# Patient Record
Sex: MALE | Race: WHITE | NOT HISPANIC OR LATINO | URBAN - METROPOLITAN AREA
[De-identification: names, ages, dates, MRNs, and addresses within clinical notes are randomized per-mention and may not be internally consistent; named-entity substitution may affect disease eponyms.]

---

## 2017-03-04 ENCOUNTER — EMERGENCY (EMERGENCY)
Facility: HOSPITAL | Age: 57
LOS: 1 days | Discharge: HOME | End: 2017-03-04
Attending: EMERGENCY MEDICINE | Admitting: EMERGENCY MEDICINE
Payer: COMMERCIAL

## 2017-03-04 VITALS
TEMPERATURE: 98 F | RESPIRATION RATE: 22 BRPM | HEART RATE: 104 BPM | SYSTOLIC BLOOD PRESSURE: 144 MMHG | OXYGEN SATURATION: 100 % | DIASTOLIC BLOOD PRESSURE: 95 MMHG

## 2017-03-04 DIAGNOSIS — T78.09XA ANAPHYLACTIC REACTION DUE TO OTHER FOOD PRODUCTS, INITIAL ENCOUNTER: ICD-10-CM

## 2017-03-04 DIAGNOSIS — R21 RASH AND OTHER NONSPECIFIC SKIN ERUPTION: ICD-10-CM

## 2017-03-04 DIAGNOSIS — Z91.010 ALLERGY TO PEANUTS: ICD-10-CM

## 2017-03-04 PROCEDURE — 99284 EMERGENCY DEPT VISIT MOD MDM: CPT

## 2017-03-04 RX ORDER — SODIUM CHLORIDE 9 MG/ML
1000 INJECTION INTRAMUSCULAR; INTRAVENOUS; SUBCUTANEOUS ONCE
Qty: 0 | Refills: 0 | Status: COMPLETED | OUTPATIENT
Start: 2017-03-04 | End: 2017-03-04

## 2017-03-04 RX ORDER — FAMOTIDINE 10 MG/ML
20 INJECTION INTRAVENOUS ONCE
Qty: 0 | Refills: 0 | Status: COMPLETED | OUTPATIENT
Start: 2017-03-04 | End: 2017-03-04

## 2017-03-04 RX ORDER — ASPIRIN/CALCIUM CARB/MAGNESIUM 324 MG
325 TABLET ORAL ONCE
Qty: 0 | Refills: 0 | Status: COMPLETED | OUTPATIENT
Start: 2017-03-04 | End: 2017-03-04

## 2017-03-04 RX ORDER — EPINEPHRINE 0.3 MG/.3ML
0.3 INJECTION INTRAMUSCULAR; SUBCUTANEOUS ONCE
Qty: 0 | Refills: 0 | Status: COMPLETED | OUTPATIENT
Start: 2017-03-04 | End: 2017-03-04

## 2017-03-04 RX ORDER — EPINEPHRINE 0.3 MG/.3ML
0.3 INJECTION INTRAMUSCULAR; SUBCUTANEOUS
Qty: 1 | Refills: 0 | OUTPATIENT
Start: 2017-03-04

## 2017-03-04 RX ADMIN — Medication 125 MILLIGRAM(S): at 20:02

## 2017-03-04 RX ADMIN — EPINEPHRINE 0.3 MILLIGRAM(S): 0.3 INJECTION INTRAMUSCULAR; SUBCUTANEOUS at 20:02

## 2017-03-04 RX ADMIN — FAMOTIDINE 20 MILLIGRAM(S): 10 INJECTION INTRAVENOUS at 20:02

## 2017-03-04 RX ADMIN — SODIUM CHLORIDE 1000 MILLILITER(S): 9 INJECTION INTRAMUSCULAR; INTRAVENOUS; SUBCUTANEOUS at 20:02

## 2017-03-04 RX ADMIN — Medication 325 MILLIGRAM(S): at 21:23

## 2017-03-04 NOTE — ED PROVIDER NOTE - OBJECTIVE STATEMENT
55 y/o M presents to ED c/o 55 y/o M presents to ED c/o throat and skin itching, redness, tongue swelling while eating dinner at a restaurant.  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  He left the restaurant and bought Benadryl.  He chewed 75 mg

## 2017-03-04 NOTE — ED PROVIDER NOTE - MEDICAL DECISION MAKING DETAILS
anaphylactic reaction, now resolved after epi and meds, stable after observation period, will discharge with rx for prednisone and new epi pen, return precautions

## 2017-03-04 NOTE — ED ADULT NURSE NOTE - OBJECTIVE STATEMENT
patient comes in for allergic reaction. known allergies to hazelnuts, and most other nuts. patient states had dinner and felt had a chest tightness, and mild throat swelling.

## 2017-03-04 NOTE — ED ADULT NURSE REASSESSMENT NOTE - NS ED NURSE REASSESS COMMENT FT1
Pt resting in bed on continuous cardiac monitoring. Able to complete full sentences. No distress. To continue to monitor.

## 2022-10-07 NOTE — ED PROVIDER NOTE - CHIEF COMPLAINT
The patient is a 56y Male complaining of allergic reaction. [de-identified] : The patient has a long standing Tracheostomy Tube in position. Chronic respiratory failure; muscular dystrophy, status post spinal reconstruction; difficult intubation with failure to extubate;laryngotracheal stenosis, tracheomalacia, and poor pulmonary reserve. s/p 9/2019 trach with Dr. Ash\par \par There is no peristomal granulation, stenosis, or purulence. \par \par The caretakers have not had any issues with monthly trach changes.\par \par There have been no accidental decannulations.\par \par There is no current purulence through the trach tube.\par \par The patient is currently tolerating the VENT SETTINGS.\par \par The patient is NOT using a passy-lissett valve or CAPPING.